# Patient Record
Sex: MALE | Race: OTHER | URBAN - METROPOLITAN AREA
[De-identification: names, ages, dates, MRNs, and addresses within clinical notes are randomized per-mention and may not be internally consistent; named-entity substitution may affect disease eponyms.]

---

## 2017-08-07 ENCOUNTER — EMERGENCY (EMERGENCY)
Facility: HOSPITAL | Age: 42
LOS: 1 days | Discharge: HOME | End: 2017-08-07
Attending: EMERGENCY MEDICINE
Payer: COMMERCIAL

## 2017-08-07 VITALS
SYSTOLIC BLOOD PRESSURE: 132 MMHG | TEMPERATURE: 99 F | HEIGHT: 69 IN | HEART RATE: 108 BPM | RESPIRATION RATE: 16 BRPM | WEIGHT: 240.08 LBS | DIASTOLIC BLOOD PRESSURE: 80 MMHG | OXYGEN SATURATION: 97 %

## 2017-08-07 VITALS
RESPIRATION RATE: 17 BRPM | OXYGEN SATURATION: 99 % | TEMPERATURE: 98 F | HEART RATE: 65 BPM | DIASTOLIC BLOOD PRESSURE: 62 MMHG | SYSTOLIC BLOOD PRESSURE: 130 MMHG
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
## 2017-08-07 DIAGNOSIS — R56.9 UNSPECIFIED CONVULSIONS: ICD-10-CM

## 2017-08-07 LAB
ANION GAP SERPL CALC-SCNC: 7 MMOL/L — SIGNIFICANT CHANGE UP (ref 5–17)
BASOPHILS # BLD AUTO: 0.1 K/UL — SIGNIFICANT CHANGE UP (ref 0–0.2)
BASOPHILS NFR BLD AUTO: 0.7 % — SIGNIFICANT CHANGE UP (ref 0–2)
BUN SERPL-MCNC: 16 MG/DL — SIGNIFICANT CHANGE UP (ref 7–18)
CALCIUM SERPL-MCNC: 8.8 MG/DL — SIGNIFICANT CHANGE UP (ref 8.4–10.5)
CHLORIDE SERPL-SCNC: 108 MMOL/L — SIGNIFICANT CHANGE UP (ref 96–108)
CO2 SERPL-SCNC: 27 MMOL/L — SIGNIFICANT CHANGE UP (ref 22–31)
CREAT SERPL-MCNC: 1.19 MG/DL — SIGNIFICANT CHANGE UP (ref 0.5–1.3)
EOSINOPHIL # BLD AUTO: 0.1 K/UL — SIGNIFICANT CHANGE UP (ref 0–0.5)
EOSINOPHIL NFR BLD AUTO: 1.2 % — SIGNIFICANT CHANGE UP (ref 0–6)
GLUCOSE SERPL-MCNC: 135 MG/DL — HIGH (ref 70–99)
HCT VFR BLD CALC: 46.7 % — SIGNIFICANT CHANGE UP (ref 39–50)
HGB BLD-MCNC: 15.5 G/DL — SIGNIFICANT CHANGE UP (ref 13–17)
LYMPHOCYTES # BLD AUTO: 1.1 K/UL — SIGNIFICANT CHANGE UP (ref 1–3.3)
LYMPHOCYTES # BLD AUTO: 11.6 % — LOW (ref 13–44)
MCHC RBC-ENTMCNC: 31.9 PG — SIGNIFICANT CHANGE UP (ref 27–34)
MCHC RBC-ENTMCNC: 33.2 GM/DL — SIGNIFICANT CHANGE UP (ref 32–36)
MCV RBC AUTO: 96.1 FL — SIGNIFICANT CHANGE UP (ref 80–100)
MONOCYTES # BLD AUTO: 0.5 K/UL — SIGNIFICANT CHANGE UP (ref 0–0.9)
MONOCYTES NFR BLD AUTO: 5.1 % — SIGNIFICANT CHANGE UP (ref 2–14)
NEUTROPHILS # BLD AUTO: 7.7 K/UL — HIGH (ref 1.8–7.4)
NEUTROPHILS NFR BLD AUTO: 81.4 % — HIGH (ref 43–77)
PLATELET # BLD AUTO: 217 K/UL — SIGNIFICANT CHANGE UP (ref 150–400)
POTASSIUM SERPL-MCNC: 4.1 MMOL/L — SIGNIFICANT CHANGE UP (ref 3.5–5.3)
POTASSIUM SERPL-SCNC: 4.1 MMOL/L — SIGNIFICANT CHANGE UP (ref 3.5–5.3)
RBC # BLD: 4.86 M/UL — SIGNIFICANT CHANGE UP (ref 4.2–5.8)
RBC # FLD: 11.9 % — SIGNIFICANT CHANGE UP (ref 10.3–14.5)
SODIUM SERPL-SCNC: 142 MMOL/L — SIGNIFICANT CHANGE UP (ref 135–145)
TROPONIN I SERPL-MCNC: <0.015 NG/ML — SIGNIFICANT CHANGE UP (ref 0–0.04)
WBC # BLD: 9.4 K/UL — SIGNIFICANT CHANGE UP (ref 3.8–10.5)
WBC # FLD AUTO: 9.4 K/UL — SIGNIFICANT CHANGE UP (ref 3.8–10.5)

## 2017-08-07 PROCEDURE — 84484 ASSAY OF TROPONIN QUANT: CPT

## 2017-08-07 PROCEDURE — 36000 PLACE NEEDLE IN VEIN: CPT

## 2017-08-07 PROCEDURE — 80048 BASIC METABOLIC PNL TOTAL CA: CPT

## 2017-08-07 PROCEDURE — 70450 CT HEAD/BRAIN W/O DYE: CPT

## 2017-08-07 PROCEDURE — 99284 EMERGENCY DEPT VISIT MOD MDM: CPT | Mod: 25

## 2017-08-07 PROCEDURE — 70450 CT HEAD/BRAIN W/O DYE: CPT | Mod: 26

## 2017-08-07 PROCEDURE — 90715 TDAP VACCINE 7 YRS/> IM: CPT

## 2017-08-07 PROCEDURE — 85027 COMPLETE CBC AUTOMATED: CPT

## 2017-08-07 PROCEDURE — 99285 EMERGENCY DEPT VISIT HI MDM: CPT

## 2017-08-07 PROCEDURE — 90471 IMMUNIZATION ADMIN: CPT

## 2017-08-07 PROCEDURE — 93005 ELECTROCARDIOGRAM TRACING: CPT

## 2017-08-07 RX ORDER — TETANUS TOXOID, REDUCED DIPHTHERIA TOXOID AND ACELLULAR PERTUSSIS VACCINE, ADSORBED 5; 2.5; 8; 8; 2.5 [IU]/.5ML; [IU]/.5ML; UG/.5ML; UG/.5ML; UG/.5ML
0.5 SUSPENSION INTRAMUSCULAR ONCE
Qty: 0 | Refills: 0 | Status: COMPLETED | OUTPATIENT
Start: 2017-08-07 | End: 2017-08-07

## 2017-08-07 RX ORDER — SODIUM CHLORIDE 9 MG/ML
1000 INJECTION INTRAMUSCULAR; INTRAVENOUS; SUBCUTANEOUS ONCE
Qty: 0 | Refills: 0 | Status: COMPLETED | OUTPATIENT
Start: 2017-08-07 | End: 2017-08-07

## 2017-08-07 RX ADMIN — TETANUS TOXOID, REDUCED DIPHTHERIA TOXOID AND ACELLULAR PERTUSSIS VACCINE, ADSORBED 0.5 MILLILITER(S): 5; 2.5; 8; 8; 2.5 SUSPENSION INTRAMUSCULAR at 18:01

## 2017-08-07 RX ADMIN — SODIUM CHLORIDE 2000 MILLILITER(S): 9 INJECTION INTRAMUSCULAR; INTRAVENOUS; SUBCUTANEOUS at 18:02

## 2017-08-07 NOTE — ED PROVIDER NOTE - MEDICAL DECISION MAKING DETAILS
41 y/o M pt with Hx of seizures, presents with unwitnessed fall; found on the ground. Syncope versus seizure. On exam with posterior scalp hematoma otherwise normal exam. Per EMS, pt had normal FS and was post-ictal. Will check labs including cardiac enzymes as possible syncope, and CT Head to r/o ICH. Pt reports compliance and no EtOH use.

## 2017-08-07 NOTE — ED ADULT NURSE NOTE - OBJECTIVE STATEMENT
pt bought in by ems pt s/p fall and LOC pt has the bruises to the back of the head and bleeding. pt has h/o seizures

## 2017-08-07 NOTE — ED PROVIDER NOTE - CRANIAL NERVE AND PUPILLARY EXAM
cranial nerves 2-12 intact/tongue is midline/central and peripheral vision intact/extra-ocular movements intact/gag reflex intact/central vision intact/cough reflex intact/corneal reflex intact/peripheral vision intact

## 2017-08-07 NOTE — ED PROVIDER NOTE - OBJECTIVE STATEMENT
43 y/o M pt with PMHx of Seizures (on Lamictal TID and Topamax x5 a day, compliant; last seizure x2 years ago) and no significant PSHx BIBA to ED s/p seizure episode with head trauma and associated head wound today. As per EMS, pt was witnessed to fall down x15 seconds, however no seizure activity was noted at the time; EMS found pt to be tachycardic at 112 and 120, as well as with post-ictal confusion on the field. EMS notes pt's blood glucose level was 102 on the field, and pt was found pale with no short-term memory at the time; EMS denies pt incontinence. In ED, pt reports being in the food court of [facility] when the episode occurred. Pt states he is feeling improved in ED. Pt denies recent fever, chills, cough, or any other complaints. Pt also denies recent illness, or being UTD on tetanus vaccination. Pt notes sleeping well last night. Pt also notes his next Lamictal dose is due at 20:00pm today. Allergies: Penicillin (rash).

## 2020-06-05 NOTE — ED ADULT TRIAGE NOTE - SPO2 (%)
normal appearance , without tenderness upon palpation , no deformities , trachea midline , Thyroid normal size , no thyroid nodules , no masses , no JVD , thyroid nontender 97

## 2023-09-28 NOTE — ED PROVIDER NOTE - NSCAREINITIATED _GEN_ER
Kymberly Prieto(Attending) Is This A New Presentation, Or A Follow-Up?: Nail Discoloration Additional History: Est pt to [facility] last seen by [facility] 3 months ago presents for nail discoloration on the right middle finger